# Patient Record
Sex: MALE | Race: WHITE | NOT HISPANIC OR LATINO | URBAN - METROPOLITAN AREA
[De-identification: names, ages, dates, MRNs, and addresses within clinical notes are randomized per-mention and may not be internally consistent; named-entity substitution may affect disease eponyms.]

---
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
## 2025-05-21 ENCOUNTER — HOSPITAL ENCOUNTER (INPATIENT)
Facility: CLINIC | Age: 20
DRG: 897 | End: 2025-05-21
Attending: STUDENT IN AN ORGANIZED HEALTH CARE EDUCATION/TRAINING PROGRAM | Admitting: PSYCHIATRY & NEUROLOGY
Payer: COMMERCIAL

## 2025-05-21 ENCOUNTER — TELEPHONE (OUTPATIENT)
Dept: BEHAVIORAL HEALTH | Facility: CLINIC | Age: 20
End: 2025-05-21
Payer: COMMERCIAL

## 2025-05-21 DIAGNOSIS — R56.9 ALCOHOL WITHDRAWAL SEIZURE WITH COMPLICATION (H): ICD-10-CM

## 2025-05-21 DIAGNOSIS — F19.20 CHEMICAL DEPENDENCY (H): Primary | ICD-10-CM

## 2025-05-21 DIAGNOSIS — F10.939 ALCOHOL WITHDRAWAL SEIZURE WITH COMPLICATION (H): ICD-10-CM

## 2025-05-21 DIAGNOSIS — F41.1 ANXIETY STATE: ICD-10-CM

## 2025-05-21 LAB
ALBUMIN SERPL BCG-MCNC: 5.2 G/DL (ref 3.5–5.2)
ALP SERPL-CCNC: 135 U/L (ref 40–150)
ALT SERPL W P-5'-P-CCNC: 101 U/L (ref 0–70)
AMPHETAMINES UR QL SCN: ABNORMAL
ANION GAP SERPL CALCULATED.3IONS-SCNC: 17 MMOL/L (ref 7–15)
AST SERPL W P-5'-P-CCNC: 132 U/L (ref 0–45)
BARBITURATES UR QL SCN: ABNORMAL
BASOPHILS # BLD AUTO: 0.1 10E3/UL (ref 0–0.2)
BASOPHILS NFR BLD AUTO: 1 %
BENZODIAZ UR QL SCN: ABNORMAL
BILIRUB SERPL-MCNC: 1.8 MG/DL
BUN SERPL-MCNC: 9.5 MG/DL (ref 6–20)
BZE UR QL SCN: ABNORMAL
CALCIUM SERPL-MCNC: 10.3 MG/DL (ref 8.8–10.4)
CANNABINOIDS UR QL SCN: ABNORMAL
CHLORIDE SERPL-SCNC: 97 MMOL/L (ref 98–107)
CREAT SERPL-MCNC: 0.81 MG/DL (ref 0.67–1.17)
EGFRCR SERPLBLD CKD-EPI 2021: >90 ML/MIN/1.73M2
EOSINOPHIL # BLD AUTO: 0.1 10E3/UL (ref 0–0.7)
EOSINOPHIL NFR BLD AUTO: 2 %
ERYTHROCYTE [DISTWIDTH] IN BLOOD BY AUTOMATED COUNT: 12.4 % (ref 10–15)
ETHANOL SERPL-MCNC: <0.01 G/DL
FENTANYL UR QL: ABNORMAL
GGT SERPL-CCNC: 277 U/L (ref 8–61)
GLUCOSE SERPL-MCNC: 85 MG/DL (ref 70–99)
HCO3 SERPL-SCNC: 26 MMOL/L (ref 22–29)
HCT VFR BLD AUTO: 45.4 % (ref 40–53)
HGB BLD-MCNC: 16.1 G/DL (ref 13.3–17.7)
IMM GRANULOCYTES # BLD: 0 10E3/UL
IMM GRANULOCYTES NFR BLD: 0 %
LYMPHOCYTES # BLD AUTO: 1.3 10E3/UL (ref 0.8–5.3)
LYMPHOCYTES NFR BLD AUTO: 27 %
MAGNESIUM SERPL-MCNC: 1.7 MG/DL (ref 1.7–2.3)
MCH RBC QN AUTO: 34 PG (ref 26.5–33)
MCHC RBC AUTO-ENTMCNC: 35.5 G/DL (ref 31.5–36.5)
MCV RBC AUTO: 96 FL (ref 78–100)
MONOCYTES # BLD AUTO: 0.8 10E3/UL (ref 0–1.3)
MONOCYTES NFR BLD AUTO: 17 %
NEUTROPHILS # BLD AUTO: 2.4 10E3/UL (ref 1.6–8.3)
NEUTROPHILS NFR BLD AUTO: 52 %
NRBC # BLD AUTO: 0 10E3/UL
NRBC BLD AUTO-RTO: 0 /100
OPIATES UR QL SCN: ABNORMAL
PCP QUAL URINE (ROCHE): ABNORMAL
PLATELET # BLD AUTO: 255 10E3/UL (ref 150–450)
POTASSIUM SERPL-SCNC: 3.9 MMOL/L (ref 3.4–5.3)
PROT SERPL-MCNC: 8.3 G/DL (ref 6.4–8.3)
RBC # BLD AUTO: 4.73 10E6/UL (ref 4.4–5.9)
SODIUM SERPL-SCNC: 140 MMOL/L (ref 135–145)
TSH SERPL DL<=0.005 MIU/L-ACNC: 2.43 UIU/ML (ref 0.3–4.2)
WBC # BLD AUTO: 4.7 10E3/UL (ref 4–11)

## 2025-05-21 PROCEDURE — 82077 ASSAY SPEC XCP UR&BREATH IA: CPT

## 2025-05-21 PROCEDURE — 84443 ASSAY THYROID STIM HORMONE: CPT | Performed by: NURSE PRACTITIONER

## 2025-05-21 PROCEDURE — 250N000013 HC RX MED GY IP 250 OP 250 PS 637: Performed by: STUDENT IN AN ORGANIZED HEALTH CARE EDUCATION/TRAINING PROGRAM

## 2025-05-21 PROCEDURE — 85014 HEMATOCRIT: CPT

## 2025-05-21 PROCEDURE — 83036 HEMOGLOBIN GLYCOSYLATED A1C: CPT | Performed by: PSYCHIATRY & NEUROLOGY

## 2025-05-21 PROCEDURE — 99285 EMERGENCY DEPT VISIT HI MDM: CPT | Performed by: STUDENT IN AN ORGANIZED HEALTH CARE EDUCATION/TRAINING PROGRAM

## 2025-05-21 PROCEDURE — HZ2ZZZZ DETOXIFICATION SERVICES FOR SUBSTANCE ABUSE TREATMENT: ICD-10-PCS | Performed by: PSYCHIATRY & NEUROLOGY

## 2025-05-21 PROCEDURE — 83735 ASSAY OF MAGNESIUM: CPT

## 2025-05-21 PROCEDURE — 250N000013 HC RX MED GY IP 250 OP 250 PS 637

## 2025-05-21 PROCEDURE — 82977 ASSAY OF GGT: CPT | Performed by: NURSE PRACTITIONER

## 2025-05-21 PROCEDURE — 80053 COMPREHEN METABOLIC PANEL: CPT

## 2025-05-21 PROCEDURE — 250N000013 HC RX MED GY IP 250 OP 250 PS 637: Performed by: REGISTERED NURSE

## 2025-05-21 PROCEDURE — 99207 PR NO CHARGE LOS: CPT | Performed by: NURSE PRACTITIONER

## 2025-05-21 PROCEDURE — 99285 EMERGENCY DEPT VISIT HI MDM: CPT | Mod: 25 | Performed by: STUDENT IN AN ORGANIZED HEALTH CARE EDUCATION/TRAINING PROGRAM

## 2025-05-21 PROCEDURE — 128N000004 HC R&B CD ADULT

## 2025-05-21 PROCEDURE — 80307 DRUG TEST PRSMV CHEM ANLYZR: CPT

## 2025-05-21 PROCEDURE — 36415 COLL VENOUS BLD VENIPUNCTURE: CPT

## 2025-05-21 RX ORDER — MAGNESIUM HYDROXIDE/ALUMINUM HYDROXICE/SIMETHICONE 120; 1200; 1200 MG/30ML; MG/30ML; MG/30ML
30 SUSPENSION ORAL EVERY 4 HOURS PRN
Status: DISCONTINUED | OUTPATIENT
Start: 2025-05-21 | End: 2025-05-23 | Stop reason: HOSPADM

## 2025-05-21 RX ORDER — ONDANSETRON 4 MG/1
4 TABLET, ORALLY DISINTEGRATING ORAL EVERY 6 HOURS PRN
Status: DISCONTINUED | OUTPATIENT
Start: 2025-05-21 | End: 2025-05-23 | Stop reason: HOSPADM

## 2025-05-21 RX ORDER — GABAPENTIN 100 MG/1
100 CAPSULE ORAL EVERY 8 HOURS
Status: DISCONTINUED | OUTPATIENT
Start: 2025-05-29 | End: 2025-05-22

## 2025-05-21 RX ORDER — ACETAMINOPHEN 325 MG/1
650 TABLET ORAL EVERY 4 HOURS PRN
Status: DISCONTINUED | OUTPATIENT
Start: 2025-05-21 | End: 2025-05-23 | Stop reason: HOSPADM

## 2025-05-21 RX ORDER — GABAPENTIN 300 MG/1
300 CAPSULE ORAL EVERY 8 HOURS
Status: DISCONTINUED | OUTPATIENT
Start: 2025-05-27 | End: 2025-05-22

## 2025-05-21 RX ORDER — CLONIDINE HYDROCHLORIDE 0.1 MG/1
0.1 TABLET ORAL EVERY 8 HOURS
Status: DISCONTINUED | OUTPATIENT
Start: 2025-05-21 | End: 2025-05-22

## 2025-05-21 RX ORDER — DIAZEPAM 5 MG/1
10 TABLET ORAL EVERY 30 MIN PRN
Status: DISCONTINUED | OUTPATIENT
Start: 2025-05-21 | End: 2025-05-21

## 2025-05-21 RX ORDER — DIAZEPAM 10 MG/2ML
5-10 INJECTION, SOLUTION INTRAMUSCULAR; INTRAVENOUS EVERY 30 MIN PRN
Status: DISCONTINUED | OUTPATIENT
Start: 2025-05-21 | End: 2025-05-21

## 2025-05-21 RX ORDER — FOLIC ACID 1 MG/1
1 TABLET ORAL DAILY
Status: DISCONTINUED | OUTPATIENT
Start: 2025-05-21 | End: 2025-05-23 | Stop reason: HOSPADM

## 2025-05-21 RX ORDER — DIAZEPAM 5 MG/1
5-20 TABLET ORAL EVERY 30 MIN PRN
Status: DISCONTINUED | OUTPATIENT
Start: 2025-05-21 | End: 2025-05-21

## 2025-05-21 RX ORDER — FLUMAZENIL 0.1 MG/ML
0.2 INJECTION, SOLUTION INTRAVENOUS
Status: DISCONTINUED | OUTPATIENT
Start: 2025-05-21 | End: 2025-05-22

## 2025-05-21 RX ORDER — HALOPERIDOL 5 MG/ML
2.5-5 INJECTION INTRAMUSCULAR EVERY 6 HOURS PRN
Status: DISCONTINUED | OUTPATIENT
Start: 2025-05-21 | End: 2025-05-22

## 2025-05-21 RX ORDER — LOPERAMIDE HYDROCHLORIDE 2 MG/1
2 CAPSULE ORAL 4 TIMES DAILY PRN
Status: DISCONTINUED | OUTPATIENT
Start: 2025-05-21 | End: 2025-05-23 | Stop reason: HOSPADM

## 2025-05-21 RX ORDER — GABAPENTIN 300 MG/1
600 CAPSULE ORAL EVERY 8 HOURS
Status: DISCONTINUED | OUTPATIENT
Start: 2025-05-25 | End: 2025-05-22

## 2025-05-21 RX ORDER — TRAZODONE HYDROCHLORIDE 50 MG/1
50 TABLET ORAL
Status: DISCONTINUED | OUTPATIENT
Start: 2025-05-21 | End: 2025-05-23 | Stop reason: HOSPADM

## 2025-05-21 RX ORDER — DIAZEPAM 5 MG/1
5-20 TABLET ORAL EVERY 30 MIN PRN
Status: DISCONTINUED | OUTPATIENT
Start: 2025-05-21 | End: 2025-05-22

## 2025-05-21 RX ORDER — GABAPENTIN 300 MG/1
900 CAPSULE ORAL EVERY 8 HOURS
Status: DISCONTINUED | OUTPATIENT
Start: 2025-05-22 | End: 2025-05-22

## 2025-05-21 RX ORDER — MULTIPLE VITAMINS W/ MINERALS TAB 9MG-400MCG
1 TAB ORAL DAILY
Status: DISCONTINUED | OUTPATIENT
Start: 2025-05-21 | End: 2025-05-23 | Stop reason: HOSPADM

## 2025-05-21 RX ORDER — AMOXICILLIN 250 MG
1 CAPSULE ORAL 2 TIMES DAILY PRN
Status: DISCONTINUED | OUTPATIENT
Start: 2025-05-21 | End: 2025-05-23 | Stop reason: HOSPADM

## 2025-05-21 RX ORDER — GABAPENTIN 600 MG/1
1200 TABLET ORAL ONCE
Status: COMPLETED | OUTPATIENT
Start: 2025-05-21 | End: 2025-05-21

## 2025-05-21 RX ORDER — OLANZAPINE 5 MG/1
5-10 TABLET, ORALLY DISINTEGRATING ORAL EVERY 6 HOURS PRN
Status: DISCONTINUED | OUTPATIENT
Start: 2025-05-21 | End: 2025-05-22

## 2025-05-21 RX ORDER — HYDROXYZINE HYDROCHLORIDE 25 MG/1
25 TABLET, FILM COATED ORAL EVERY 4 HOURS PRN
Status: DISCONTINUED | OUTPATIENT
Start: 2025-05-21 | End: 2025-05-23 | Stop reason: HOSPADM

## 2025-05-21 RX ORDER — ATENOLOL 50 MG/1
50 TABLET ORAL DAILY PRN
Status: DISCONTINUED | OUTPATIENT
Start: 2025-05-21 | End: 2025-05-21

## 2025-05-21 RX ADMIN — CLONIDINE HYDROCHLORIDE 0.1 MG: 0.1 TABLET ORAL at 18:52

## 2025-05-21 RX ADMIN — GABAPENTIN 1200 MG: 600 TABLET, FILM COATED ORAL at 18:50

## 2025-05-21 RX ADMIN — Medication 1 TABLET: at 18:50

## 2025-05-21 RX ADMIN — FOLIC ACID 1 MG: 1 TABLET ORAL at 18:52

## 2025-05-21 RX ADMIN — THIAMINE HCL TAB 100 MG 100 MG: 100 TAB at 18:52

## 2025-05-21 RX ADMIN — DIAZEPAM 10 MG: 5 TABLET ORAL at 20:35

## 2025-05-21 RX ADMIN — DIAZEPAM 5 MG: 5 TABLET ORAL at 17:58

## 2025-05-21 ASSESSMENT — COLUMBIA-SUICIDE SEVERITY RATING SCALE - C-SSRS
6. HAVE YOU EVER DONE ANYTHING, STARTED TO DO ANYTHING, OR PREPARED TO DO ANYTHING TO END YOUR LIFE?: YES
3. HAVE YOU BEEN THINKING ABOUT HOW YOU MIGHT KILL YOURSELF?: YES
4. HAVE YOU HAD THESE THOUGHTS AND HAD SOME INTENTION OF ACTING ON THEM?: YES
1. IN THE PAST MONTH, HAVE YOU WISHED YOU WERE DEAD OR WISHED YOU COULD GO TO SLEEP AND NOT WAKE UP?: YES
5. HAVE YOU STARTED TO WORK OUT OR WORKED OUT THE DETAILS OF HOW TO KILL YOURSELF? DO YOU INTEND TO CARRY OUT THIS PLAN?: YES
BASED ON RESPONSES TO C-SSRS QS 1-6, WHAT IS THE PATIENT'S OVERALL RISK RATING FOR SUICIDE: HIGH RISK
2. HAVE YOU ACTUALLY HAD ANY THOUGHTS OF KILLING YOURSELF IN THE PAST MONTH?: YES
3. HAVE YOU BEEN THINKING ABOUT HOW YOU MIGHT KILL YOURSELF?: YES
4. HAVE YOU HAD THESE THOUGHTS AND HAD SOME INTENTION OF ACTING ON THEM?: YES
5. HAVE YOU STARTED TO WORK OUT OR WORKED OUT THE DETAILS OF HOW TO KILL YOURSELF? DO YOU INTEND TO CARRY OUT THIS PLAN?: YES
2. HAVE YOU ACTUALLY HAD ANY THOUGHTS OF KILLING YOURSELF IN THE PAST MONTH?: YES
1. IN THE PAST MONTH, HAVE YOU WISHED YOU WERE DEAD OR WISHED YOU COULD GO TO SLEEP AND NOT WAKE UP?: YES
6. HAVE YOU EVER DONE ANYTHING, STARTED TO DO ANYTHING, OR PREPARED TO DO ANYTHING TO END YOUR LIFE?: YES

## 2025-05-21 ASSESSMENT — ACTIVITIES OF DAILY LIVING (ADL)
ADLS_ACUITY_SCORE: 41
ADLS_ACUITY_SCORE: 15
HYGIENE/GROOMING: INDEPENDENT
LAUNDRY: WITH SUPERVISION
ORAL_HYGIENE: INDEPENDENT
ADLS_ACUITY_SCORE: 15
ADLS_ACUITY_SCORE: 41
ADLS_ACUITY_SCORE: 41
DRESS: INDEPENDENT
ADLS_ACUITY_SCORE: 15

## 2025-05-21 NOTE — TELEPHONE ENCOUNTER
S: [facility] , Provider Cousins calling at 6:34 PM with clinical on 20 year old/male presenting for alcohol detox.     B: Pt presents for ETOH detox.   Currently reports drinking 175 ml every couple of days with multiple beers for months.    Patient reports last use was yesterday at 2 pm.  Pt BAC: 0  Pt  denies hx of DT  Pt  denies hx of seizures. Last seizure: N/A  Pt endorsing the following symptoms of withdrawal: Shaky, Tremulous, Anxious, and Perspiration   MSSA Score: 2/3    Pt denies acute mental health or medical concerns.   Pt denies other drug use: None Amount/frequency: N/A    Does Pt have a detox care plan in Epic? None  Does pt present with specific needs, assistive devices, or exclusionary criteria? None  Is the patient ambulating, eating and drinking in the ED? Yes    A: Pt meets criteria to be presented for IP detox admission. Patient is voluntary    COVID Symptoms: No  If yes, COVID test required   Utox: In process  Magnesium: WNL  CMP: Abnormalities:   AST is 132 ALT is 101  CBC: WNL  HCG: N/A     R: Patient cleared and ready for behavioral bed placement: Yes    Pt is meeting criteria for presentation to 3A/CD    Does Patient need a Transfer Center request created? No, Pt is located within [facility] ED, [facility] ED, or [place] ED    6:40 PM Paged unit 3A On Call Provider    Provider accepted Pt via Teams Chat message for 3A/CD/Nicolette Brian  7:08 PM  MRN [id number] may be accepted to 3A/detox for Dr Damon after UDS results are back.      Updated worklist and added to the admit board. Did Indicia    7:13 PM Updated unit 3A     7:13 PM Updated [facility] ED

## 2025-05-21 NOTE — ED PROVIDER NOTES
"ED Provider Note  [facility]      History     Chief Complaint   Patient presents with    Drug / Alcohol Assessment    Suicidal     HPI  César Solomon is a 20 year old male who presents to the ED for alcohol withdrawal seeking alcohol detox. He relates that his last drink was around 2-3 pm. States that he has been drinking .75 L of whiskey daily with 1-2 beers in addition. He states that he has been drinking this much for the past few months since to avoid withdrawing from alcohol. He denies history of alcohol detox treatment. Denies past symptoms of seizures or Dts.   Patient endorses symptoms current of tremors, sweaty, and nausea.   Patient reports to me that he does not currently have any suicidal or homicidal ideation, no hallucinations, has felt passive SI in the past, but not currently feeling suicidal.  Feels safe in the hospital. Relating that his SI has improved over the past weeks as his stress from college and withdrawing have improved. Denies HI.     Past Medical History  No past medical history on file.  No past surgical history on file.  No current outpatient medications on file.    No Known Allergies  Family History  No family history on file.  Social History       A medically appropriate review of systems was performed with pertinent positives and negatives noted in the HPI, and all other systems negative.    Physical Exam   BP: (!) 140/85  Pulse: 95  Temp: 98.5  F (36.9  C)  Resp: 16  Height: 188 cm (6' 2\")  Weight: 74 kg (163 lb 1.6 oz)  SpO2: 97 %  Physical Exam  Constitutional:       General: He is not in acute distress.     Appearance: Normal appearance. He is not toxic-appearing.   HENT:      Head: Normocephalic and atraumatic.   Eyes:      General: No scleral icterus.     Conjunctiva/sclera: Conjunctivae normal.   Cardiovascular:      Rate and Rhythm: Normal rate and regular rhythm.      Pulses: Normal pulses.      Heart sounds: Normal heart sounds.   Pulmonary: "      Effort: Pulmonary effort is normal. No respiratory distress.      Breath sounds: Normal breath sounds.   Abdominal:      Palpations: Abdomen is soft.      Tenderness: There is no abdominal tenderness.   Musculoskeletal:         General: No deformity.      Cervical back: Neck supple.   Skin:     General: Skin is warm.   Neurological:      Mental Status: He is alert and oriented to person, place, and time.      Comments: Slight hand shaking and tongue fasciculation, otherwise neurologic intact, no focal deficits           ED Course, Procedures, & Data      Procedures                Results for orders placed or performed during the hospital encounter of 05/21/25   Comprehensive metabolic panel     Status: Abnormal   Result Value Ref Range    Sodium 140 135 - 145 mmol/L    Potassium 3.9 3.4 - 5.3 mmol/L    Carbon Dioxide (CO2) 26 22 - 29 mmol/L    Anion Gap 17 (H) 7 - 15 mmol/L    Urea Nitrogen 9.5 6.0 - 20.0 mg/dL    Creatinine 0.81 0.67 - 1.17 mg/dL    GFR Estimate >90 >60 mL/min/1.73m2    Calcium 10.3 8.8 - 10.4 mg/dL    Chloride 97 (L) 98 - 107 mmol/L    Glucose 85 70 - 99 mg/dL    Alkaline Phosphatase 135 40 - 150 U/L     (H) 0 - 45 U/L     (H) 0 - 70 U/L    Protein Total 8.3 6.4 - 8.3 g/dL    Albumin 5.2 3.5 - 5.2 g/dL    Bilirubin Total 1.8 (H) <=1.2 mg/dL   Magnesium     Status: Normal   Result Value Ref Range    Magnesium 1.7 1.7 - 2.3 mg/dL   Ethanol Level Blood     Status: Normal   Result Value Ref Range    Ethanol Level Blood <0.01 <=0.01 g/dL   CBC with platelets and differential     Status: Abnormal   Result Value Ref Range    WBC Count 4.7 4.0 - 11.0 10e3/uL    RBC Count 4.73 4.40 - 5.90 10e6/uL    Hemoglobin 16.1 13.3 - 17.7 g/dL    Hematocrit 45.4 40.0 - 53.0 %    MCV 96 78 - 100 fL    MCH 34.0 (H) 26.5 - 33.0 pg    MCHC 35.5 31.5 - 36.5 g/dL    RDW 12.4 10.0 - 15.0 %    Platelet Count 255 150 - 450 10e3/uL    % Neutrophils 52 %    % Lymphocytes 27 %    % Monocytes 17 %    %  Eosinophils 2 %    % Basophils 1 %    % Immature Granulocytes 0 %    NRBCs per 100 WBC 0 <1 /100    Absolute Neutrophils 2.4 1.6 - 8.3 10e3/uL    Absolute Lymphocytes 1.3 0.8 - 5.3 10e3/uL    Absolute Monocytes 0.8 0.0 - 1.3 10e3/uL    Absolute Eosinophils 0.1 0.0 - 0.7 10e3/uL    Absolute Basophils 0.1 0.0 - 0.2 10e3/uL    Absolute Immature Granulocytes 0.0 <=0.4 10e3/uL    Absolute NRBCs 0.0 10e3/uL   CBC with platelets differential     Status: Abnormal    Narrative    The following orders were created for panel order CBC with platelets differential.  Procedure                               Abnormality         Status                     ---------                               -----------         ------                     CBC with platelets and ...[[id number]]  Abnormal            Final result                 Please view results for these tests on the individual orders.   Urine Drug Screen     Status: None ()    Narrative    The following orders were created for panel order Urine Drug Screen.  Procedure                               Abnormality         Status                     ---------                               -----------         ------                     Urine Drug Screen Panel[[id number]]                                                      Please view results for these tests on the individual orders.     Medications   OLANZapine zydis (zyPREXA) ODT tab 5-10 mg (has no administration in time range)     Or   haloperidol lactate (HALDOL) injection 2.5-5 mg (has no administration in time range)   flumazenil (ROMAZICON) injection 0.2 mg (has no administration in time range)   melatonin tablet 5 mg (has no administration in time range)   cloNIDine (CATAPRES) tablet 0.1 mg (has no administration in time range)   diazepam (VALIUM) tablet 10 mg (has no administration in time range)     Or   diazepam (VALIUM) injection 5-10 mg (has no administration in time range)   gabapentin (NEURONTIN) tablet 1,200 mg (has no  administration in time range)   gabapentin (NEURONTIN) capsule 900 mg (has no administration in time range)   gabapentin (NEURONTIN) capsule 600 mg (has no administration in time range)   gabapentin (NEURONTIN) capsule 300 mg (has no administration in time range)   gabapentin (NEURONTIN) capsule 100 mg (has no administration in time range)   multivitamin w/minerals (THERA-VIT-M) tablet 1 tablet (has no administration in time range)   thiamine (B-1) tablet 100 mg (has no administration in time range)   folic acid (FOLVITE) tablet 1 mg (has no administration in time range)     Labs Ordered and Resulted from Time of ED Arrival to Time of ED Departure   COMPREHENSIVE METABOLIC PANEL - Abnormal       Result Value    Sodium 140      Potassium 3.9      Carbon Dioxide (CO2) 26      Anion Gap 17 (*)     Urea Nitrogen 9.5      Creatinine 0.81      GFR Estimate >90      Calcium 10.3      Chloride 97 (*)     Glucose 85      Alkaline Phosphatase 135       (*)      (*)     Protein Total 8.3      Albumin 5.2      Bilirubin Total 1.8 (*)    CBC WITH PLATELETS AND DIFFERENTIAL - Abnormal    WBC Count 4.7      RBC Count 4.73      Hemoglobin 16.1      Hematocrit 45.4      MCV 96      MCH 34.0 (*)     MCHC 35.5      RDW 12.4      Platelet Count 255      % Neutrophils 52      % Lymphocytes 27      % Monocytes 17      % Eosinophils 2      % Basophils 1      % Immature Granulocytes 0      NRBCs per 100 WBC 0      Absolute Neutrophils 2.4      Absolute Lymphocytes 1.3      Absolute Monocytes 0.8      Absolute Eosinophils 0.1      Absolute Basophils 0.1      Absolute Immature Granulocytes 0.0      Absolute NRBCs 0.0     MAGNESIUM - Normal    Magnesium 1.7     ETHANOL LEVEL BLOOD - Normal    Ethanol Level Blood <0.01     URINE DRUG SCREEN PANEL     No orders to display          Critical care was not performed.     Medical Decision Making  The patient's presentation was of high complexity (an acute health issue posing potential  threat to life or bodily function).    The patient's evaluation involved:  review of external note(s) from 3+ sources (see separate area of note for details)  review of 3+ test result(s) ordered prior to this encounter (see separate area of note for details)  ordering and/or review of 3+ test(s) in this encounter (see separate area of note for details)    The patient's management necessitated high risk (a decision regarding hospitalization).    Assessment & Plan    Alcohol Withdrawal   Will presenting to the ED with symptoms of alcohol withdrawal. He goal was to seek alcohol withdrawal treatment. Patient lacks a history of severe withdrawal symptoms. He does endorse SI without plan and patient feels safe in the hospital. Alcohol detox bed was reserved for patient and routine admission labs were drawn. Plan is to admit César upstairs for alcohol detox.     --    ED Attending Physician Attestation    FRANCK Patterson MD, cared for this patient with the Resident. I have performed a history and physical examination of the patient and discussed management with the resident. I reviewed the resident's documentation above and agree with the documented findings and plan of care.    Summary of HPI, PE, ED Course   Patient is a 20 year old male evaluated in the emergency department for alcohol withdrawal. Exam and ED course notable for reassuring physical exam and workup. After the completion of care in the emergency department, the patient was admitted to inpatient.  Patient was admitted to detox      Hosea Patterson MD  Emergency Medicine      I have reviewed the nursing notes. I have reviewed the findings, diagnosis, plan and need for follow up with the patient.    New Prescriptions    No medications on file       Final diagnoses:   Alcohol withdrawal seizure with complication (H)   Alphonse Maria, PGY1    Hosea Patterson MD on 5/21/2025 at 6:37 PM   [facility] EMERGENCY DEPARTMENT  5/21/2025     Leonardo  MD Hosea  05/21/25 7671       Hosea Patterson MD  05/21/25 1527

## 2025-05-22 VITALS
HEIGHT: 74 IN | RESPIRATION RATE: 16 BRPM | HEART RATE: 60 BPM | TEMPERATURE: 97.1 F | OXYGEN SATURATION: 99 % | WEIGHT: 163 LBS | SYSTOLIC BLOOD PRESSURE: 119 MMHG | BODY MASS INDEX: 20.92 KG/M2 | DIASTOLIC BLOOD PRESSURE: 76 MMHG

## 2025-05-22 PROBLEM — F10.239 ALCOHOL DEPENDENCE WITH WITHDRAWAL WITH COMPLICATION (H): Status: ACTIVE | Noted: 2025-05-21

## 2025-05-22 LAB
ALBUMIN SERPL BCG-MCNC: 4.8 G/DL (ref 3.5–5.2)
ALP SERPL-CCNC: 120 U/L (ref 40–150)
ALT SERPL W P-5'-P-CCNC: 84 U/L (ref 0–70)
ANION GAP SERPL CALCULATED.3IONS-SCNC: 12 MMOL/L (ref 7–15)
AST SERPL W P-5'-P-CCNC: 99 U/L (ref 0–45)
BILIRUB SERPL-MCNC: 1.3 MG/DL
BUN SERPL-MCNC: 8.6 MG/DL (ref 6–20)
CALCIUM SERPL-MCNC: 10.3 MG/DL (ref 8.8–10.4)
CHLORIDE SERPL-SCNC: 99 MMOL/L (ref 98–107)
CREAT SERPL-MCNC: 0.84 MG/DL (ref 0.67–1.17)
EGFRCR SERPLBLD CKD-EPI 2021: >90 ML/MIN/1.73M2
EST. AVERAGE GLUCOSE BLD GHB EST-MCNC: 97 MG/DL
GLUCOSE SERPL-MCNC: 69 MG/DL (ref 70–99)
HBA1C MFR BLD: 5 %
HCO3 SERPL-SCNC: 28 MMOL/L (ref 22–29)
HOLD SPECIMEN: NORMAL
POTASSIUM SERPL-SCNC: 4.2 MMOL/L (ref 3.4–5.3)
PROT SERPL-MCNC: 7.7 G/DL (ref 6.4–8.3)
SODIUM SERPL-SCNC: 139 MMOL/L (ref 135–145)

## 2025-05-22 PROCEDURE — 99222 1ST HOSP IP/OBS MODERATE 55: CPT | Performed by: PHYSICIAN ASSISTANT

## 2025-05-22 PROCEDURE — 250N000013 HC RX MED GY IP 250 OP 250 PS 637: Performed by: PSYCHIATRY & NEUROLOGY

## 2025-05-22 PROCEDURE — 250N000013 HC RX MED GY IP 250 OP 250 PS 637: Performed by: STUDENT IN AN ORGANIZED HEALTH CARE EDUCATION/TRAINING PROGRAM

## 2025-05-22 PROCEDURE — 99223 1ST HOSP IP/OBS HIGH 75: CPT | Mod: GC | Performed by: PSYCHIATRY & NEUROLOGY

## 2025-05-22 PROCEDURE — 128N000004 HC R&B CD ADULT

## 2025-05-22 PROCEDURE — 36415 COLL VENOUS BLD VENIPUNCTURE: CPT | Performed by: PSYCHIATRY & NEUROLOGY

## 2025-05-22 PROCEDURE — 80053 COMPREHEN METABOLIC PANEL: CPT | Performed by: PSYCHIATRY & NEUROLOGY

## 2025-05-22 PROCEDURE — 250N000013 HC RX MED GY IP 250 OP 250 PS 637: Performed by: NURSE PRACTITIONER

## 2025-05-22 RX ORDER — LORAZEPAM 1 MG/1
1-4 TABLET ORAL EVERY 30 MIN PRN
Status: DISCONTINUED | OUTPATIENT
Start: 2025-05-22 | End: 2025-05-23

## 2025-05-22 RX ORDER — CLONIDINE HYDROCHLORIDE 0.1 MG/1
0.1 TABLET ORAL EVERY 8 HOURS
Status: DISCONTINUED | OUTPATIENT
Start: 2025-05-22 | End: 2025-05-22

## 2025-05-22 RX ORDER — GABAPENTIN 300 MG/1
900 CAPSULE ORAL EVERY 8 HOURS
Status: DISCONTINUED | OUTPATIENT
Start: 2025-05-22 | End: 2025-05-22

## 2025-05-22 RX ADMIN — HYDROXYZINE HYDROCHLORIDE 25 MG: 25 TABLET, FILM COATED ORAL at 21:16

## 2025-05-22 RX ADMIN — HYDROXYZINE HYDROCHLORIDE 25 MG: 25 TABLET, FILM COATED ORAL at 13:45

## 2025-05-22 RX ADMIN — THIAMINE HCL TAB 100 MG 100 MG: 100 TAB at 09:02

## 2025-05-22 RX ADMIN — HYDROXYZINE HYDROCHLORIDE 25 MG: 25 TABLET, FILM COATED ORAL at 16:48

## 2025-05-22 RX ADMIN — Medication 1 TABLET: at 09:01

## 2025-05-22 RX ADMIN — FOLIC ACID 1 MG: 1 TABLET ORAL at 09:02

## 2025-05-22 RX ADMIN — HYDROXYZINE HYDROCHLORIDE 25 MG: 25 TABLET, FILM COATED ORAL at 09:01

## 2025-05-22 RX ADMIN — TRAZODONE HYDROCHLORIDE 50 MG: 50 TABLET ORAL at 21:16

## 2025-05-22 RX ADMIN — LORAZEPAM 2 MG: 1 TABLET ORAL at 09:06

## 2025-05-22 ASSESSMENT — ACTIVITIES OF DAILY LIVING (ADL)
ORAL_HYGIENE: INDEPENDENT
ADLS_ACUITY_SCORE: 15
HYGIENE/GROOMING: INDEPENDENT
ADLS_ACUITY_SCORE: 15
ADLS_ACUITY_SCORE: 15
DRESS: SCRUBS (BEHAVIORAL HEALTH)
ADLS_ACUITY_SCORE: 15
LAUNDRY: UNABLE TO COMPLETE
ADLS_ACUITY_SCORE: 15
ADLS_ACUITY_SCORE: 15
ORAL_HYGIENE: INDEPENDENT
DRESS: INDEPENDENT
ADLS_ACUITY_SCORE: 15
HYGIENE/GROOMING: INDEPENDENT
ADLS_ACUITY_SCORE: 15

## 2025-05-22 NOTE — CONSULTS
"[facility]  Consult Note - Hospitalist Service  Date of Admission:  5/21/2025  Consult Requested by: Psychiatry   Reason for Consult: \"detox\"    Assessment & Plan   César Solomon is a 20 year old male with no significant PMH who was admitted on 5/21/2025 to inpatient detox for acute alcohol withdrawal.     Medicine was consulted for medical co-management.     Acute alcohol withdrawal   Presented with an undetectable blood alcohol level. Has bene drinking 750mL of whiskey daily with 1-2 beers in addition, has been drinking this much for the past few months to avoid withdrawing. Last drink was 2-3 hours prior to presenting to ED. Denies history of withdrawal seizures or Dts.    -Management per Psychiatry  -MSSA/CIWA with valium   -Thiamine/folate/MVI    Elevated LFTs   Presented with , , T Bilirubin of 1.8. Alk phos wnl. No prior CMP for comparison. No prior abdominal imaging. Repeat showed improvment. No abdominal pain, nausea, vomiting   -Recheck with PCP in ~1 week from discharge     Cannabis use: note don UDS     Medicine will sign off at this time. Please reach out to medicine team if further questions arise.      The patient's care was discussed with the Bedside Nurse, Patient, and Primary team via this note.    Clinically Significant Risk Factors Present on Admission          # Hypochloremia: Lowest Cl = 97 mmol/L in last 2 days, will monitor as appropriate                               Ernestina Mckenna PA-C  Hospitalist Service  Securely message with [employer] (more info)  Text page via [facility] Paging/Directory   ______________________________________________________________________    Chief Complaint   Detox     History is obtained from the patient    History of Present Illness   César Solomon is a 20 year old male with no significant PMH who was admitted on 5/21/2025 to inpatient detox for acute alcohol withdrawal.     Medicine was consulted for " "medical co-management.     Only withdrawal symptom that he is appreciating today tremor. He also is experiencing anxiety, but he thinks that might be cause he has never been to detox before and this is a different environment. No sweats, hallucinations, headaches, nausea, vomiting, loose stools. No other symptoms outside of withdrawal including chest pain, fever, chills, dyspnea, cough, urinary sxs, rashes or swelling in his legs. He comments that he has flunked out of school in the setting of alcohol use and he wants to get \"back on track\". No PMH and no meds per his report.       Past Medical History    No past medical history on file.    Past Surgical History   No past surgical history on file.    Medications   I have reviewed this patient's current medications       Review of Systems    The 10 point Review of Systems is negative other than noted in the HPI or here.     Social History   I have reviewed this patient's social history and updated it with pertinent information if needed.         Family History           Allergies   Allergies   Allergen Reactions    Pollen Extract Other (See Comments)        Physical Exam   Vital Signs: Temp: 97.1  F (36.2  C) Temp src: Temporal BP: 113/77 Pulse: 76   Resp: 16 SpO2: 98 % O2 Device: None (Room air)    Weight: 163 lbs 0 oz    General: sitting up on edge of bed, alert, cooperative, awake, in no acute distress  HEENT: normocephalic, atraumatic, anicteric sclera  Respiratory: breathing comfortably on room air, clear to auscultation bilaterally without wheezing, crackles, or rhonchi appreciated  Cardiac: regular rate and rhythm with normal S1/S2 without murmurs, clicks, rubs or gallops, 2+ radial pulse on LUE, no signs of peripheral edema bilaterally  GI: soft, non-distended, normoactive bowel sounds, non-tender per palpation  Neuro: grossly non-focal, alert and oriented, normal speech, somewhat tremulous   MSK: no bony deformities, moving all extremities " independently  Skin: no rashes or lesions on uncovered surfaces, no jaundice      Medical Decision Making       60 MINUTES SPENT BY ME on the date of service doing chart review, history, exam, documentation & further activities per the note.      Data   NOTE: Data reviewed over the past 24 hrs contributes toward MDM complexity

## 2025-05-22 NOTE — PLAN OF CARE
César Solomon is a 20-year-old male who was admitted from the emergency room to station 3A for alcohol detox. Per chart review and ED report patient has been drinking 0.75 L of whiskey and beer 1-2 daily for the past few months. The patient's last drink was around 2 or 3 pm yesterday, denied symptoms of seizure and DTs. Patient reported his stressor were having bad grades in school. Labs: Utox positive for cannabis, ALT-101, AST-132 and bilirubin total 1.8.   Patient is alert and oriented x 4, denied pain and discomfort, and communicates needs appropriately. Patient skin clean dry and intact, ambulate independently and denied recent falls. Patient first admission for detox, endorses anxiety 3/10 and depression 4/10, no aggressive or assaultive behavior. Patient was visible in the lounge area and observed socializing with peers and staff. Patient denies SI/SIB/HI and hallucination.    MSSA 8, received prn valium 10 mg once.

## 2025-05-22 NOTE — CONSULTS
Brief Psychiatry Note   Chart and labs reviewed. Patient meets criteria for 3A detox admission to detox from [facility] once his UDS results are back. Orders for admission placed.     Most Recent 2 LFT's:  Recent Labs   Lab Test 05/21/25  1746   *   *   ALKPHOS 135   BILITOTAL 1.8*       Nicolette Regan, KOLBY CNP

## 2025-05-22 NOTE — PLAN OF CARE
Problem: Adult Behavioral Health Plan of Care  Goal: Plan of Care Review  Recent Flowsheet Documentation  Taken 5/22/2025 1300 by Valentina Coburn, RN  Patient Agreement with Plan of Care: agrees  Taken 5/22/2025 1000 by Valentina Coburn, RN  Patient Agreement with Plan of Care: agrees   Goal Outcome Evaluation:    Plan of Care Reviewed With: patient       Pt quiet and socially withdrawn, spent the shift in the lounge sitting by himself, this is his first time in detox and is unsure what is expected. Unit tour and expectations given, pt completed admission packet and met with case management.   He was tremulous and anxious this morning, denied pain , MSSA 8/2. Pt is interested in IOP, near home, pt encouraged to complete [facility]IL and safety plan paperwork. VSS.         Hydroxyzine 25mg given x2 this shift.

## 2025-05-22 NOTE — DISCHARGE INSTRUCTIONS
Behavioral Discharge Planning and Instructions  THANK YOU FOR CHOOSING [facility]  3A  [phone]    Summary: You were admitted to Station [facility] on *** for detoxification from ***.  A medical exam was performed that included lab work. You have met with a [facility] Counselor and opted to ***.  Please take care and make your recovery a daily priority, César!  It was a pleasure working with you and the entire treatment team here wishes you the very best in your recovery!     Recommendation:  ***    Main Diagnoses:    {Substance Related Use Disorders:703866}    Major Treatments, Procedures and Findings:  You were treated for *** detoxification using ***. As an outpatient you will be prescribed ***, please take this medication as prescribed until it is gone. You have met with a [facility] counselor to develop a treatment plan for discharge. You had labs drawn and those results were reviewed with you. Please take a copy of your lab work with you to your next primary care provider appointment.    Symptoms to Report:  If you experience more anxiety, confusion, sleeplessness, deep sadness or thoughts of suicide, notify your treatment team or notify your primary care provider. IF ANY OF THE SYMPTOMS YOU ARE EXPERIENCING ARE A MEDICAL EMERGENCY CALL 911 IMMEDIATELY.     Lifestyle Adjustment: Adjust your lifestyle to get enough sleep, relaxation, exercise and good nutrition. Continue to develop healthy coping skills to decrease stress and promote a sober living environment. Do not use mood altering substances including alcohol, illegal drugs or addictive medications other than what is currently prescribed.     Disposition: ***    Facts about COVID19 at www.cdc.gov/COVID19 and www.[place].gov/covid19    Keeping hands clean is one of the most important steps we can take to avoid getting sick and spreading germs to others.  Please wash your hands frequently and lather with soap for at least 20 seconds!    Follow-up Appointment:    Appointment Date/Time: ***    Psychiatrist/Primary Care Giver: ***    Address: ***    Phone Number: ***      Recovery apps for your phone for educational purposes and to locate in person and zoom recovery meetings  Everything AA -  lavelle is a great resource  12 Step Toolkit - educational purpose learning about the 12 steps to recovery  [place] [employer] - meeting lavelle  AA  - meeting lavelle  Meeting guide - meeting lavelle  Quick NA meeting - meeting lavelle  Haile- has various apps    Patient Navigation Hub  [facility] Navigators work to be your point-of-contact for trustworthy and compassionate care from Inpatient services to [facility] Programmatic Care. We will provide resources and communication to help guide you into programmatic care. Ultimately, our goal is to be the one-stop-shop of communication, coordination, and support for your journey to programmatic care.  Phone: [phone]    Resources:  AA/NA meetings have returned to in-person or a hybrid combination of zoom/in-person therefore please check online to verify*  Need Support Now? If you or someone you know is struggling or in crisis, help is available. Call or text Gemmyo8 or chat [employer].Apruve  AA meetings search for them at: https://aa-intergroup.org (worldwide meeting listings)  AA meetings for [place] area can be found online at: https://[place].org (click local online meetings listings)  NA meetings for [place] area can be found online at: https://www.nam[place].org  (click find a meeting)  AA and NA Sponsors are excellent resources for support and you can find one at any support group meeting.   Alcoholics Anonymous (https://aa.org/): for information 24 hours/day  AA Intergroup service office in [place] (http://www.aastpaul.org/) [phone]  AA Intergroup service office in [facility]: 198.351.2697. (http://www.a[place].org/)  Narcotics Anonymous (www.nam[place].org) (011)  "643-2203  https://aafairviewriverside.org/meetings  SMART Recovery - self management for addiction recovery:  www.smartrecovery.org  Pathways ~ A Health Crisis Resource & Support Center:  837.715.1406.  https://prescribetoprevent.org/patient-education/videos/  http://www.harmreduction.org  Crisis Text Line  Text 275476  You will be connected with a trained live crisis counselor to provide support. Por espanol, texto  JERMAINE a 893235 o texto a 442-AYUDAME en WhatsApp  [place] Hope Line  1.800.SUICIDE [1498066]  [facility] 154-661-0223  Support Group:  AA/NA and Sponsor/support.  Fast Tracker  Linking people to mental health and substance use disorder resources  RotaryViewckCoolirisn.org   [place] Mental Health Warm Line  Peer to peer support  Monday thru Saturday, 12 pm to 10 pm  169.033.0095 or 2.963.916.6390  Text \"Support\" to 58148  National [place] on Mental Illness (ROSA MARIA)  074.308.3624 or 1.888.ROSA MARIA.HELPS  Alcoholics Anonymous (www.alcoholics-anonymous.org): Check your phone book for your local chapter.  Suicide Awareness Voices of Education (SAVE) (www.save.org): 697-962-OXGQ (1583)  National Suicide Prevention Line (www.mentalhealthmn.org): 618-246-AIPI (0768)  Mental Health Consumer/Survivor Network of [place] (www.mhcsn.net): 583.546.9382 or 709-718-9182  Mental Health Association of [place] (www.mentalhealth.org): 394.916.1410 or 699-201-4294   Substance Abuse and Mental Health Services (www.samhsa.gov)  [place] Opioid Prevention Coalition: www.opioidcoalition.org    [place] Recovery Connection (MRC)  [facility] connects people seeking recovery to resources that help foster and sustain long-term recovery.  Whether you are seeking resources for treatment, transportation, housing, job training, education, health care or other pathways to recovery, [facility] is a great place to start.  367.621.1065.  www.meinKauf.org    Great Pod casts for nutrition and wellness  Listen on Apple Podcasts  Dishing Up " Nutrition   [employer]   Nutrition       Understand the connection between what you eat and how you feel. Hosted by licensed nutritionists and dietitians from [employer] [facility] we share practical, real-life solutions for healthier living through nutrition.     General Medication Instructions:   See your medication sheet(s) for instructions.   Take all medications as prescribed.  Make no changes unless your primary care provider suggests them.   Go to all your primary care provider visits.  Be sure to have all your required lab tests. This way, your medicines can be refilled on time.  Do not use any forms of alcohol.    Please Note: If you have any questions at anytime after you discharged please call [facility] detox unit 3A at [phone].    Here are a list of additional numbers you can call if you are wanting to resume services through [facility]:  [facility] Assessment Intake: [phone]  [facility] Adult [facility] Intensive Outpatient  call: [phone]  [facility] Admissions [phone]    Recovery Clinic call: [phone]  [facility]: [phone]  Medical Records call: [phone]  Billing Department call: [phone]    Please remember to take all of your behavioral discharge planning and lab paperwork to any follow up appointments, it contains your lab results, diagnosis, medication list and discharge recommendations.      THANK YOU FOR CHOOSING [facility]    [phone]  [facility] Admissions [phone]    Recovery Clinic call: [phone]  [facility] Center: [phone]  Medical Records call: [phone]  Billing Department call: [phone]    Please remember to take all of your behavioral discharge planning and lab paperwork to any follow up appointments, it contains your lab results, diagnosis, medication list and discharge recommendations.      THANK YOU FOR CHOOSING [facility]

## 2025-05-22 NOTE — H&P
Psychiatry History and Physical    César Solomon MRN# [id number]   Age: 20 year old YOB: 2005     Date of Admission:  5/21/2025          Assessment:   This patient is a 20 year old male with history of alcohol use disorder who presented to ED seeking detox from alcohol. Medically cleared in ED, admitted to 3A as voluntary patient. Drinking 1.75 L of whiskey every 2 to 3 days. EtOH BROOKE <0.01, UDS positive for cannabinoids. MSSA with diazepam started for alcohol withdrawal, changed to lorazepam due to LFTs. Withdrawal precautions in place, denies history of seizures. Admission labs ordered and reviewed those resulted. IM consult placed.  No safety concerns including SI and HI. Pt not taking medications PTA, open to considering AUD MAT. Pt reports goals for hospitalization are to abstinence from alcohol and get set up with some form of Treatment Programming.      Inpatient psychiatric hospitalization is warranted at this time for safety, stabilization, and possible adjustment in medications.         Diagnoses:     Alcohol use disorder, severe, dependence with withdrawal with unspecified complication  Cannabis abuse vs use disorder  Unspecified anxiety R/O substance induced mood disorder   Elevated LFTs including hyperbilirubinemia  High anion gap metabolic acidosis     Clinically Significant Risk Factors Present on Admission          # Hypochloremia: Lowest Cl = 97 mmol/L in last 2 days, will monitor as appropriate                                       Plan:   Psychiatric treatment/inteventions:  Medications:   -MSSA with lorazepam (changed from diazepam due to LFTs), thiamine, folic acid, multivitamin   -PRN hydroxyzine 25 mg every 4 hours for anxiety   -PRN trazodone 50 mg at bedtime for sleep   -consider MAT for AUD prior to discharge.  Patient provided information packets regarding acamprosate and naltrexone and disulfiram.   -pt received 1 x gabapentin 1200 mg on 5/21/25 for withdrawal per PACO  in ED  -patient interested in IOP near [place].    Laboratory/Imaging: reviewed- EtOH BROOKE <0.01, UDS positive for cannabinoids; CMP with Cl 97(L), (H0, (H), T bili 1.8(H) otherwise WNL; (H); TSH WNL; Mg WNL; CBC with MCH 34.0(H) otherwise WNL; Hgba1c ordered to complete admission labs, repeat CMP ordered to trend     Patient will be treated in therapeutic milieu with appropriate individual and group therapies as described.    Medical treatment/interventions:  Medical concerns:   1) Alcohol withdrawal  -MSSA as above  -PRN Zofran and imodium for GI distress related to withdrawal   -withdrawal precautions    2) IM consult placed for management of other medical concerns, per consult note on 5/22/25:   César Solomon is a 20 year old male with no significant PMH who was admitted on 5/21/2025 to inpatient detox for acute alcohol withdrawal.      Medicine was consulted for medical co-management.      Acute alcohol withdrawal   Presented with an undetectable blood alcohol level. Has bene drinking 750mL of whiskey daily with 1-2 beers in addition, has been drinking this much for the past few months to avoid withdrawing. Last drink was 2-3 hours prior to presenting to ED. Denies history of withdrawal seizures or Dts.    -Management per Psychiatry  -MSSA/CIWA with valium   -Thiamine/folate/MVI     Elevated LFTs   Presented with , , T Bilirubin of 1.8. Alk phos wnl. No prior CMP for comparison. No prior abdominal imaging. Repeat showed improvment. No abdominal pain, nausea, vomiting   -Recheck with PCP in ~1 week from discharge      Cannabis use: note don UDS      Medicine will sign off at this time. Please reach out to medicine team if further questions arise.         The patient's care was discussed with the Bedside Nurse, Patient, and Primary team via this note.     Ernestina Mckenna PA-C  Hospitalist Service      Legal Status: Voluntary    Safety Assessment:   Behavioral Orders    Procedures    Code 1 - Restrict to Unit    Routine Programming     As clinically indicated    Status 15     Every 15 minutes.    Suicide precautions: Suicide Risk: LOW; Clinical rationale to override score: modification to the care environment     .     Suicide Risk:   LOW     Clinical rationale to override score::   modification to the care environment    Withdrawal precautions      Pt has not required locked seclusion or restraints in the past 24 hours to maintain safety, please refer to RN documentation for further details.    The risks, benefits, alternatives and side effects have been discussed and are understood by the patient.    Disposition: Pending clinical stabilization. Will likely discharge to Home with IOP when stable.    Gabriele Agee MD  Addiction Medicine Fellow    Attestation: This patient has been seen and evaluated by me, Yanelis Damon DO on the date of this note. I have discussed this patient with the fellow and I agree with the findings and plan in this note. I have reviewed today's vital signs, medications, labs and imaging. >75 min total time that was spent in counseling and coordination of care with staff, reviewing medical record, educating patient about treatment options, side effects and benefits and alternative treatments for medications, providing supportive therapy and redirection regarding above symptoms.    Yanelis Damon DO            Chief Complaint:     Alcohol withdrawal          History of Present Illness:     César is a 20 year old male with history of alcohol use disorder who presented to ED seeking detox from alcohol.     Chart review completed including ED notes from this encounter; PCP visit on 1/27/25 in [place] for dizziness and visual changes; several Allergy care visits in last year; no prior ed visits or hospitalizations due to alcohol.  Majority of patient's care in the last few years in [place] (where patient is going to school for  business).    Per ED physician note: César Solomon is a 20 year old male who presents to the ED for alcohol withdrawal seeking alcohol detox. He relates that his last drink was around 2-3 pm. States that he has been drinking .75 L of whiskey daily with 1-2 beers in addition. He states that he has been drinking this much for the past few months since to avoid withdrawing from alcohol. He denies history of alcohol detox treatment. Denies past symptoms of seizures or Dt's.   Patient endorses symptoms current of tremors, sweaty, and nausea.   Patient reports to me that he does not currently have any suicidal or homicidal ideation, no hallucinations, has felt passive SI in the past, but not currently feeling suicidal.  Feels safe in the hospital. Relating that his SI has improved over the past weeks as his stress from college and withdrawing have improved. Denies HI.     Upon interview: Pt confirms information above, reports he was able to sleep well first time in a while after receiving medications for withdrawal.  Reports his first drink was at 15 years old and began drinking regularly in college.  Drinking became out of control beginning of fall 2024 where he was having several drinks multiple times per day.  The drinking caused him to do poorly in school.  He has tried to stop drinking 2 times but each time had intolerable withdrawal symptoms including nausea vomiting and night sweats.  Reports occasionally using cannabis to deal with this as well.  Used to smoke cannabis regularly in high school but less so during college when his drinking got worse.  Denies significant mental health history but has had a worsening in mood, feeling more down and passive suicidal ideation when intoxicated in the past, denies having any active SI with plan or intent and denies any self harm or suicide attempts or psychiatric admissions.  Feels he has a good support system with his family in [place] and his girlfriend.  He has  never taken medications regularly for his health, but would like to learn more about medications for alcohol use disorder.  He would be interested in treatment center programming however he would not like to be an inpatient program where he would have to stay over at the location, is open to an IOP.  Since arriving he has not had a bowel movement but has not been able to eat well and urinate okay. Denies other physical health concerns.               Psychiatric Review of Systems:   Depression:   Reports: changes in sleep, helplessness,   Denies: depressed mood, suicidal ideation, decreased interest, changes in appetite, guilt, hopelessness, impaired concentration, decreased energy, irritability.  Madisyn:   Denies: sleeplessness, impulsiveness, racing thoughts, increased goal-directed activities, pressured speech, increase in energy  Psychosis:   Denies: visual hallucinations, auditory hallucinations, paranoia, grandiosity, ideas of reference, thought insertions, thought broadcasting.  Anxiety:   Denies: excessive worries that are difficult to control, panic attacks  PTSD:   Denies: re-experiencing past trauma, nightmares, increased arousal, avoidance of traumatic stimuli, impaired function.  OCD:   Denies: obsessions, checking, symmetry, cleaning, skin picking.  ED:   Reports: Denies: restriction, binging, purging, excessive exercise, laxative abuse         Medical Review of Systems:     10 point review of systems is otherwise negative unless noted above.            Psychiatric History:   Psychiatric Hospitalizations: Denies  History of Psychosis: Denies  Prior ECT: Denies  Court Commitment: Denies  Suicide Attempts: Denies  Self-injurious Behavior: Denies  Violence toward others: Denies  Use of Psychotropics: Denies         Substance Use History:   Alcohol: See HPI   Cannabis: See HPI  Nicotine: none  Cocaine: none  Methamphetamine: none  Opiates/Heroin: none  Benzodiazepines: none  Hallucinogens: none  Inhalants:  none      Prior Chemical Dependency treatment: none          Social History:   Upbringing: Grew up in 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  No family history on file.            Past Medical History:     No past medical history on file.    History of seizures: denies         Past Surgical History:     No past surgical history on file.           Allergies:      Allergies   Allergen Reactions    Pollen Extract Other (See Comments)              Medications:   I have reviewed this patient's current medications    No medications prior to admission.             Labs:     Recent Results (from the past 24 hours)   Comprehensive metabolic panel    Collection Time: 05/21/25  5:46 PM   Result Value Ref Range    Sodium 140 135 - 145 mmol/L    Potassium 3.9 3.4 - 5.3 mmol/L    Carbon Dioxide (CO2) 26 22 - 29 mmol/L    Anion Gap 17 (H) 7 - 15 mmol/L    Urea Nitrogen 9.5 6.0 - 20.0 mg/dL    Creatinine 0.81 0.67 - 1.17 mg/dL    GFR Estimate >90 >60 mL/min/1.73m2    Calcium 10.3 8.8 - 10.4 mg/dL    Chloride 97 (L) 98 - 107 mmol/L    Glucose 85 70 - 99 mg/dL    Alkaline Phosphatase 135 40 - 150 U/L     (H) 0 - 45 U/L     (H) 0 - 70 U/L    Protein Total 8.3 6.4 - 8.3 g/dL    Albumin 5.2 3.5 - 5.2 g/dL    Bilirubin Total 1.8 (H) <=1.2 mg/dL   Magnesium    Collection Time: 05/21/25  5:46 PM   Result Value Ref Range    Magnesium 1.7 1.7 - 2.3 mg/dL   Ethanol Level Blood    Collection Time: 05/21/25  5:46 PM   Result Value Ref Range    Ethanol Level Blood <0.01 <=0.01  g/dL   CBC with platelets and differential    Collection Time: 05/21/25  5:46 PM   Result Value Ref Range    WBC Count 4.7 4.0 - 11.0 10e3/uL    RBC Count 4.73 4.40 - 5.90 10e6/uL    Hemoglobin 16.1 13.3 - 17.7 g/dL    Hematocrit 45.4 40.0 - 53.0 %    MCV 96 78 - 100 fL    MCH 34.0 (H) 26.5 - 33.0 pg    MCHC 35.5 31.5 - 36.5 g/dL    RDW 12.4 10.0 - 15.0 %    Platelet Count 255 150 - 450 10e3/uL    % Neutrophils 52 %    % Lymphocytes 27 %    % Monocytes 17 %    % Eosinophils 2 %    % Basophils 1 %    % Immature Granulocytes 0 %    NRBCs per 100 WBC 0 <1 /100    Absolute Neutrophils 2.4 1.6 - 8.3 10e3/uL    Absolute Lymphocytes 1.3 0.8 - 5.3 10e3/uL    Absolute Monocytes 0.8 0.0 - 1.3 10e3/uL    Absolute Eosinophils 0.1 0.0 - 0.7 10e3/uL    Absolute Basophils 0.1 0.0 - 0.2 10e3/uL    Absolute Immature Granulocytes 0.0 <=0.4 10e3/uL    Absolute NRBCs 0.0 10e3/uL   GGT    Collection Time: 05/21/25  5:46 PM   Result Value Ref Range     (H) 8 - 61 U/L   TSH with free T4 reflex    Collection Time: 05/21/25  5:46 PM   Result Value Ref Range    TSH 2.43 0.30 - 4.20 uIU/mL   Hemoglobin A1c    Collection Time: 05/21/25  5:46 PM   Result Value Ref Range    Estimated Average Glucose 97 <117 mg/dL    Hemoglobin A1C 5.0 <5.7 %   Urine Drug Screen Panel    Collection Time: 05/21/25  6:25 PM   Result Value Ref Range    Amphetamines Urine Screen Negative Screen Negative    Barbituates Urine Screen Negative Screen Negative    Benzodiazepine Urine Screen Negative Screen Negative    Cannabinoids Urine Screen Positive (A) Screen Negative    Cocaine Urine Screen Negative Screen Negative    Fentanyl Qual Urine Screen Negative Screen Negative    Opiates Urine Screen Negative Screen Negative    PCP Urine Screen Negative Screen Negative   Comprehensive metabolic panel    Collection Time: 05/22/25  9:04 AM   Result Value Ref Range    Sodium 139 135 - 145 mmol/L    Potassium 4.2 3.4 - 5.3 mmol/L    Carbon Dioxide (CO2) 28 22 - 29 mmol/L  "   Anion Gap 12 7 - 15 mmol/L    Urea Nitrogen 8.6 6.0 - 20.0 mg/dL    Creatinine 0.84 0.67 - 1.17 mg/dL    GFR Estimate >90 >60 mL/min/1.73m2    Calcium 10.3 8.8 - 10.4 mg/dL    Chloride 99 98 - 107 mmol/L    Glucose 69 (L) 70 - 99 mg/dL    Alkaline Phosphatase 120 40 - 150 U/L    AST 99 (H) 0 - 45 U/L    ALT 84 (H) 0 - 70 U/L    Protein Total 7.7 6.4 - 8.3 g/dL    Albumin 4.8 3.5 - 5.2 g/dL    Bilirubin Total 1.3 (H) <=1.2 mg/dL       /77 (BP Location: Left arm, Patient Position: Sitting, Cuff Size: Adult Regular)   Pulse 76   Temp 97.1  F (36.2  C) (Temporal)   Resp 16   Ht 1.88 m (6' 2\")   Wt 73.9 kg (163 lb)   SpO2 98%   BMI 20.93 kg/m    Weight is 163 lbs 0 oz  Body mass index is 20.93 kg/m .           Psychiatric Mental Status Examination:   Appearance: awake, alert, adequately groomed, appears recorded age  Attitude: cooperative and pleasant  Eye Contact: good  Mood:  \"Anxious\"  Affect: mood congruent and full range  Speech:  clear, coherent and normal prosody  Language: fluent in English  Psychomotor Behavior:  no evidence of tardive dyskinesia, dystonia, or tics.  Moderate tremors in the hands at baseline.  No tongue fasciculations.  Gait/Station: normal  Thought Process:  linear, logical, goal oriented  Associations:  no loose associations  Thought Content:  Denying SI/HI/AVH; no evidence of psychotic thinking  Insight:  intact  Judgement: intact  Oriented to:  time, person, and place  Attention Span and Concentration:  intact  Recent and Remote Memory:  intact  Fund of Knowledge: appropriate         Physical Exam:   Please refer to physical exam completed by ED provider, Hosea Patterson MD, on 5/21/25 as below. I agree with the findings and assessment and have no additional findings to add at this time with exception that psychiatric findings now above in MSE.   Physical Exam  Constitutional:       General: He is not in acute distress.     Appearance: Normal appearance. He is not " toxic-appearing.   HEENT:      Head: Normocephalic and atraumatic.   Eyes:      General: No scleral icterus.     Conjunctiva/sclera: Conjunctivae normal.   Cardiovascular:      Rate and Rhythm: Normal rate and regular rhythm.      Pulses: Normal pulses.      Heart sounds: Normal heart sounds.   Pulmonary:      Effort: Pulmonary effort is normal. No respiratory distress.      Breath sounds: Normal breath sounds.   Abdominal:      Palpations: Abdomen is soft.      Tenderness: There is no abdominal tenderness.   Musculoskeletal:         General: No deformity.      Cervical back: Neck supple.   Skin:     General: Skin is warm.   Neurological:      Mental Status: He is alert and oriented to person, place, and time.      Comments: Slight hand shaking and tongue fasciculation, otherwise neurologic intact, no focal deficits

## 2025-05-22 NOTE — PLAN OF CARE
Behavioral Team Discussion: (5/22/2025)    Continued Stay Criteria/Rationale: Patient admitted for Chemical Use Issues.  Plan: The following services will be provided to the patient; psychiatric assessment, medication management, therapeutic milieu, individual and group support, and skills groups.   Participants: 3A Provider: Dr. Yanelis Damon MD; 3A RN: Valentina Coburn RN; 3A CM's: ABELARDO Wiggins.  Summary/Recommendation: Providers will assess today for treatment recommendations, discharge planning, and aftercare plans. CM will meet with pt for discharge planning.   Medical/Physical: Per chart review pt reported symptoms current of tremors, sweaty, and nausea.   Precautions:   Behavioral Orders   Procedures    Code 1 - Restrict to Unit    Routine Programming     As clinically indicated    Status 15     Every 15 minutes.    Suicide precautions: Suicide Risk: LOW; Clinical rationale to override score: modification to the care environment     .     Suicide Risk:   LOW     Clinical rationale to override score::   modification to the care environment    Withdrawal precautions     Rationale for change in precautions or plan: N/A  Progress: Initial.    ASAM Dimension Scale Ratings:  Dimension 1: 2 Client has some difficulty tolerating and coping with withdrawal discomfort. Client's intoxication may be severe, but responds to support and treatment such that the client does not immediately endanger self or others. Client displays moderate signs and symptoms with moderate risk of severe withdrawal.  Dimension 2: 1 Client tolerates and nubia with physical discomfort and is able to get the services that the client needs.  Dimension 3: 1 Client has impulse control and coping skills. Client presents a mild to moderate risk of harm to self or others or displays symptoms of emotional, behavioral or cognitive problems. Client has a mental health diagnosis and is stable. Client functions adequately in significant life  areas.  Dimension 4: 2 Client displays verbal compliance, but lacks consistent behaviors; has low motivation for change; and is passively involved in treatment.  Dimension 5: 3 Client has poor recognition and understanding of relapse and recidivism issues and displays moderately high vulnerability for further substance use or mental health problems. Client has few coping skills and rarely applies coping skills.  Dimension 6: 3 Client is not engaged in structured, meaningful activity and the client's peers, family, significant other, and living environment are unsupportive, or there is significant criminal justice system involvement.

## 2025-05-22 NOTE — PROGRESS NOTES
05/21/25 2032   Patient Belongings   Did you bring any home meds/supplements to the hospital?  No   Patient Belongings other (see comments)   Belongings Search Yes   Clothing Search Yes   Second Staff Alebart     Storage Bin: Pant with string, per socks, hat ,small bag and toile tries with 1 Valentino clone, 2 Julio Salix, shaver, Deodorant and teeth paste  Meed Room Box Bin: Cell phone, wallet, 2 chargers and Necklace  Security env#[id number]: 3 [place]isa and [place] ID   A               Admission:  I am responsible for any personal items that are not sent to the safe or pharmacy.  [facility] is not responsible for loss, theft or damage of any property in my possession.    Signature:  _________________________________ Date: _______  Time: _____                                              Staff Signature:  ____________________________ Date: ________  Time: _____      2nd Staff person, if patient is unable/unwilling to sign:    Signature: ________________________________ Date: ________  Time: _____     Discharge:  [facility] has returned all of my personal belongings:    Signature: _________________________________ Date: ________  Time: _____                                          Staff Signature:  ____________________________ Date: ________  Time: _____

## 2025-05-22 NOTE — PHARMACY-ADMISSION MEDICATION HISTORY
Pharmacist Admission Medication History    Admission medication history is complete. The information provided in this note is only as accurate as the sources available at the time of the update.    Information Source(s): Patient via in-person    Pertinent Information: Pt denied medications, supplements and vitamins    Changes made to PTA medication list:  Added: None  Deleted: None  Changed: None    Allergies reviewed with patient and updates made in EHR: yes    Medication History Completed By: Isidra Pickens [facility] 5/21/2025 7:25 PM    No outpatient medications have been marked as taking for the 5/21/25 encounter (Hospital Encounter).

## 2025-05-22 NOTE — PLAN OF CARE
"Goal Outcome Evaluation:    Problem: Alcohol Withdrawal  Goal: Alcohol Withdrawal Symptom Control  Outcome: Progressing     Patient appeared to sleep for 6.75 hours.    MSSA at midnight was 4; patient was mildly tremulous.  /69   Pulse 60   Temp 97.6  F (36.4  C) (Temporal)   Resp 16   Ht 1.88 m (6' 2\")   Wt 73.9 kg (163 lb)   SpO2 97%   BMI 20.93 kg/m      MSSA at 4am was 4.  /68   Pulse 73   Temp 97.1  F (36.2  C) (Temporal)   Resp 16   Ht 1.88 m (6' 2\")   Wt 73.9 kg (163 lb)   SpO2 96%   BMI 20.93 kg/m      We'll continue to monitor.    "

## 2025-05-22 NOTE — PROGRESS NOTES
[address]     Daily Encounter: Met with team, discussed patient progress, discharge plan and any impediments to discharge.    Pt shares he has been drinking heavily for the past year and his use has caused problems with his schooling, grades and family. He reported stress at school has been his biggest trigger for use. Pt shares he is done with school this semester and is interested in attending an IOP program and will continue to live with his parents. Pt was encouraged to complete [facility] paperwork and safety plan.     Insurance: Consignd/Consignd BIND SUREST      Legal Status:  Vol   County: NA  File Number: NA  Start and expiration date of commitment: NA    SUDs Assessment Status: Need for placement     ROIs on file: None at this time.      Living Situation: Lives in [place] with his parents.      Current Providers, Supports & Collateral:  Parents, PCP at [facility]    Current Plan/Referral Status: IOP [facility] Tx     Safety Plan Status: discussed with pt, gave paperwork, will continue safety plan in a future session      ABELARDO Wiggins  [address] - Adult Inpatient Addiction Psychiatry Unit

## 2025-05-23 VITALS
SYSTOLIC BLOOD PRESSURE: 111 MMHG | WEIGHT: 163 LBS | DIASTOLIC BLOOD PRESSURE: 71 MMHG | BODY MASS INDEX: 20.92 KG/M2 | HEART RATE: 89 BPM | TEMPERATURE: 97.5 F | OXYGEN SATURATION: 98 % | HEIGHT: 74 IN | RESPIRATION RATE: 16 BRPM

## 2025-05-23 PROCEDURE — 250N000013 HC RX MED GY IP 250 OP 250 PS 637: Performed by: STUDENT IN AN ORGANIZED HEALTH CARE EDUCATION/TRAINING PROGRAM

## 2025-05-23 PROCEDURE — 99239 HOSP IP/OBS DSCHRG MGMT >30: CPT | Mod: GC | Performed by: PSYCHIATRY & NEUROLOGY

## 2025-05-23 PROCEDURE — 250N000013 HC RX MED GY IP 250 OP 250 PS 637: Performed by: PSYCHIATRY & NEUROLOGY

## 2025-05-23 RX ORDER — FOLIC ACID 1 MG/1
1 TABLET ORAL DAILY
Qty: 30 TABLET | Refills: 0 | Status: SHIPPED | OUTPATIENT
Start: 2025-05-24

## 2025-05-23 RX ORDER — NALTREXONE HYDROCHLORIDE 50 MG/1
50 TABLET, FILM COATED ORAL DAILY
Qty: 30 TABLET | Refills: 1 | Status: SHIPPED | OUTPATIENT
Start: 2025-05-23

## 2025-05-23 RX ORDER — MULTIPLE VITAMINS W/ MINERALS TAB 9MG-400MCG
1 TAB ORAL DAILY
Qty: 30 TABLET | Refills: 0 | Status: SHIPPED | OUTPATIENT
Start: 2025-05-24

## 2025-05-23 RX ORDER — HYDROXYZINE HYDROCHLORIDE 25 MG/1
25 TABLET, FILM COATED ORAL 3 TIMES DAILY PRN
Qty: 90 TABLET | Refills: 0 | Status: SHIPPED | OUTPATIENT
Start: 2025-05-23

## 2025-05-23 RX ORDER — NICOTINE 21 MG/24HR
1 PATCH, TRANSDERMAL 24 HOURS TRANSDERMAL EVERY 24 HOURS
Qty: 28 PATCH | Refills: 0 | Status: SHIPPED | OUTPATIENT
Start: 2025-05-23

## 2025-05-23 RX ORDER — TRAZODONE HYDROCHLORIDE 50 MG/1
50 TABLET ORAL
Qty: 30 TABLET | Refills: 0 | Status: SHIPPED | OUTPATIENT
Start: 2025-05-23

## 2025-05-23 RX ORDER — LANOLIN ALCOHOL/MO/W.PET/CERES
100 CREAM (GRAM) TOPICAL DAILY
Qty: 30 TABLET | Refills: 0 | Status: SHIPPED | OUTPATIENT
Start: 2025-05-24

## 2025-05-23 RX ADMIN — Medication 1 TABLET: at 08:22

## 2025-05-23 RX ADMIN — FOLIC ACID 1 MG: 1 TABLET ORAL at 08:22

## 2025-05-23 RX ADMIN — THIAMINE HCL TAB 100 MG 100 MG: 100 TAB at 08:22

## 2025-05-23 ASSESSMENT — ACTIVITIES OF DAILY LIVING (ADL)
ADLS_ACUITY_SCORE: 15
ORAL_HYGIENE: INDEPENDENT
ADLS_ACUITY_SCORE: 15
HYGIENE/GROOMING: INDEPENDENT
DRESS: SCRUBS (BEHAVIORAL HEALTH)

## 2025-05-23 NOTE — PLAN OF CARE
Problem: Alcohol Withdrawal  Goal: Alcohol Withdrawal Symptom Control  5/23/2025 0642 by Anny Weber RN  Outcome: Progressing  5/23/2025 0642 by Anny Weber RN  Outcome: Progressing     Problem: Sleep Disturbance  Goal: Adequate Sleep/Rest  Outcome: Progressing   Goal Outcome Evaluation:       Pt.appeared asleep and comfortable through the night. Breath sounds were even and regular. Scored 2 on MSSA. No prn medication administered. No safety or behavioral concerns observed or reported. Will continue to monitor.

## 2025-05-23 NOTE — PLAN OF CARE
Problem: Alcohol Withdrawal  Goal: Alcohol Withdrawal Symptom Control  Outcome: Progressing   Goal Outcome Evaluation:    Plan of Care Reviewed With: patient      Patient visible in the milieu. Engaged with peers, calm and pleasant. Patient endorsed anxiety and depression. Denied SI/HI/SIB. Patient reported good appetite. No c/o pain. MSSA scores of 6 and 4 at this shift. No prn valium given for alcohol withdrawal. Patient compliant with medications. Staff will continue to monitor.

## 2025-05-23 NOTE — DISCHARGE SUMMARY
Psychiatric Discharge Summary    César Solomon MRN# [id number]   Age: 20 year old YOB: 2005     Date of Admission:  5/21/2025  Date of Discharge:  5/23/2025  Admitting Physician:  Yanelis Damon DO  Discharge Physician:  Yanelis Damon DO         Event Leading to Hospitalization:   César is a 20 year old male with history of alcohol use disorder who presented to ED seeking detox from alcohol.      Chart review completed including ED notes from this encounter; PCP visit on 1/27/25 in [place] for dizziness and visual changes; several Allergy care visits in last year; no prior ed visits or hospitalizations due to alcohol.  Majority of patient's care in the last few years in [place] (where patient is going to school for business).     Per ED physician note: César Solomon is a 20 year old male who presents to the ED for alcohol withdrawal seeking alcohol detox. He relates that his last drink was around 2-3 pm. States that he has been drinking .75 L of whiskey daily with 1-2 beers in addition. He states that he has been drinking this much for the past few months since to avoid withdrawing from alcohol. He denies history of alcohol detox treatment. Denies past symptoms of seizures or Dt's.   Patient endorses symptoms current of tremors, sweaty, and nausea.   Patient reports to me that he does not currently have any suicidal or homicidal ideation, no hallucinations, has felt passive SI in the past, but not currently feeling suicidal.  Feels safe in the hospital. Relating that his SI has improved over the past weeks as his stress from college and withdrawing have improved. Denies HI.      Upon interview: Pt confirms information above, reports he was able to sleep well first time in a while after receiving medications for withdrawal.  Reports his first drink was at 15 years old and began drinking regularly in college.  Drinking became out of control beginning of fall 2024 where he was having  several drinks multiple times per day.  The drinking caused him to do poorly in school.  He has tried to stop drinking 2 times but each time had intolerable withdrawal symptoms including nausea vomiting and night sweats.  Reports occasionally using cannabis to deal with this as well.  Used to smoke cannabis regularly in high school but less so during college when his drinking got worse.  Denies significant mental health history but has had a worsening in mood, feeling more down and passive suicidal ideation when intoxicated in the past, denies having any active SI with plan or intent and denies any self harm or suicide attempts or psychiatric admissions.  Feels he has a good support system with his family in [place] and his girlfriend.  He has never taken medications regularly for his health, but would like to learn more about medications for alcohol use disorder. He is interested in an IOP. Would like to try naltrexone for AUD. Would like to try Nicotine patch for daily Vaping 6 mg/L day disposable cartridge. Would like to continue trazodone and hydrOXYzine for sleep and anxiety respectively after discharge. Was educated on the side effects risks and benefits of each medication. Regarding UDS reports his roomates smoke a lot of cannabis, he may smoke a few times a month, last time was 2 weeks ago. Denies other physical health concerns.        See Admission note by Gabriele Agee MD/Yanelis Damon DO on 5/22/25 for additional details.          Diagnoses:     Alcohol use disorder, severe, dependence with withdrawal with unspecified complication  Cannabis use not meeting criteria for a use disorder.  Nicotine Use Disorder, Mild  Unspecified anxiety R/O substance induced mood disorder   Elevated LFTs including hyperbilirubinemia  High anion gap metabolic acidosis     Clinically Significant Risk Factors          # Hypochloremia: Lowest Cl = 97 mmol/L in last 2 days, will monitor as appropriate                                      Labs:     Recent Results (from the past week)   Comprehensive metabolic panel    Collection Time: 05/21/25  5:46 PM   Result Value Ref Range    Sodium 140 135 - 145 mmol/L    Potassium 3.9 3.4 - 5.3 mmol/L    Carbon Dioxide (CO2) 26 22 - 29 mmol/L    Anion Gap 17 (H) 7 - 15 mmol/L    Urea Nitrogen 9.5 6.0 - 20.0 mg/dL    Creatinine 0.81 0.67 - 1.17 mg/dL    GFR Estimate >90 >60 mL/min/1.73m2    Calcium 10.3 8.8 - 10.4 mg/dL    Chloride 97 (L) 98 - 107 mmol/L    Glucose 85 70 - 99 mg/dL    Alkaline Phosphatase 135 40 - 150 U/L     (H) 0 - 45 U/L     (H) 0 - 70 U/L    Protein Total 8.3 6.4 - 8.3 g/dL    Albumin 5.2 3.5 - 5.2 g/dL    Bilirubin Total 1.8 (H) <=1.2 mg/dL   Magnesium    Collection Time: 05/21/25  5:46 PM   Result Value Ref Range    Magnesium 1.7 1.7 - 2.3 mg/dL   Ethanol Level Blood    Collection Time: 05/21/25  5:46 PM   Result Value Ref Range    Ethanol Level Blood <0.01 <=0.01 g/dL   CBC with platelets and differential    Collection Time: 05/21/25  5:46 PM   Result Value Ref Range    WBC Count 4.7 4.0 - 11.0 10e3/uL    RBC Count 4.73 4.40 - 5.90 10e6/uL    Hemoglobin 16.1 13.3 - 17.7 g/dL    Hematocrit 45.4 40.0 - 53.0 %    MCV 96 78 - 100 fL    MCH 34.0 (H) 26.5 - 33.0 pg    MCHC 35.5 31.5 - 36.5 g/dL    RDW 12.4 10.0 - 15.0 %    Platelet Count 255 150 - 450 10e3/uL    % Neutrophils 52 %    % Lymphocytes 27 %    % Monocytes 17 %    % Eosinophils 2 %    % Basophils 1 %    % Immature Granulocytes 0 %    NRBCs per 100 WBC 0 <1 /100    Absolute Neutrophils 2.4 1.6 - 8.3 10e3/uL    Absolute Lymphocytes 1.3 0.8 - 5.3 10e3/uL    Absolute Monocytes 0.8 0.0 - 1.3 10e3/uL    Absolute Eosinophils 0.1 0.0 - 0.7 10e3/uL    Absolute Basophils 0.1 0.0 - 0.2 10e3/uL    Absolute Immature Granulocytes 0.0 <=0.4 10e3/uL    Absolute NRBCs 0.0 10e3/uL   GGT    Collection Time: 05/21/25  5:46 PM   Result Value Ref Range     (H) 8 - 61 U/L   TSH with free T4 reflex    Collection Time:  05/21/25  5:46 PM   Result Value Ref Range    TSH 2.43 0.30 - 4.20 uIU/mL   Hemoglobin A1c    Collection Time: 05/21/25  5:46 PM   Result Value Ref Range    Estimated Average Glucose 97 <117 mg/dL    Hemoglobin A1C 5.0 <5.7 %   Urine Drug Screen Panel    Collection Time: 05/21/25  6:25 PM   Result Value Ref Range    Amphetamines Urine Screen Negative Screen Negative    Barbituates Urine Screen Negative Screen Negative    Benzodiazepine Urine Screen Negative Screen Negative    Cannabinoids Urine Screen Positive (A) Screen Negative    Cocaine Urine Screen Negative Screen Negative    Fentanyl Qual Urine Screen Negative Screen Negative    Opiates Urine Screen Negative Screen Negative    PCP Urine Screen Negative Screen Negative   Comprehensive metabolic panel    Collection Time: 05/22/25  9:04 AM   Result Value Ref Range    Sodium 139 135 - 145 mmol/L    Potassium 4.2 3.4 - 5.3 mmol/L    Carbon Dioxide (CO2) 28 22 - 29 mmol/L    Anion Gap 12 7 - 15 mmol/L    Urea Nitrogen 8.6 6.0 - 20.0 mg/dL    Creatinine 0.84 0.67 - 1.17 mg/dL    GFR Estimate >90 >60 mL/min/1.73m2    Calcium 10.3 8.8 - 10.4 mg/dL    Chloride 99 98 - 107 mmol/L    Glucose 69 (L) 70 - 99 mg/dL    Alkaline Phosphatase 120 40 - 150 U/L    AST 99 (H) 0 - 45 U/L    ALT 84 (H) 0 - 70 U/L    Protein Total 7.7 6.4 - 8.3 g/dL    Albumin 4.8 3.5 - 5.2 g/dL    Bilirubin Total 1.3 (H) <=1.2 mg/dL   Extra Purple Top Tube    Collection Time: 05/22/25  9:04 AM   Result Value Ref Range    Hold Specimen [facility]             Consults:    IM consult placed for management of other medical concerns, per consult note on 5/22/25:   César Solomon is a 20 year old male with no significant PMH who was admitted on 5/21/2025 to inpatient detox for acute alcohol withdrawal.      Medicine was consulted for medical co-management.      Acute alcohol withdrawal   Presented with an undetectable blood alcohol level. Has bene drinking 750 mL of whiskey daily with 1-2 beers in addition, has  been drinking this much for the past few months to avoid withdrawing. Last drink was 2-3 hours prior to presenting to ED. Denies history of withdrawal seizures or Dt's.    -Management per Psychiatry  -MSSA/CIWA with valium   -Thiamine/folate/MVI     Elevated LFTs   Presented with , , T Bilirubin of 1.8. Alk phos wnl. No prior CMP for comparison. No prior abdominal imaging. Repeat showed improvement. No abdominal pain, nausea, vomiting   -Recheck with PCP in ~1 week from discharge      Cannabis use: note don UDS      Medicine will sign off at this time. Please reach out to medicine team if further questions arise.         The patient's care was discussed with the Bedside Nurse, Patient, and Primary team via this note.     Ernestina Mckenna PA-C  Hospitalist Service         Hospital Course:   César Solomon is a 20 year old male with history of alcohol use disorder who presented to ED seeking detox from alcohol. Medically cleared in ED, admitted to  as voluntary patient. Drinking 1.75 L of whiskey every 2 to 3 days. EtOH BROOKE <0.01, UDS positive for cannabinoids. MSSA with diazepam started for alcohol withdrawal, changed to lorazepam due to LFTs. Withdrawal precautions in place, denies history of seizures. Admission labs ordered and reviewed those resulted. IM consult placed.  No safety concerns including SI and HI. Pt not taking medications PTA, open to considering AUD MAT. Pt reports goals for hospitalization are to abstinence from alcohol and get set up with some form of Treatment Programming.    The patient's symptoms of alcohol withdrawal improved. He was out of detox on 5/23 and requested to discharge home. He is interested in medications for AUD, and Nicotine Use disorder, anxiety and sleep. Discussed R/B/A of naltrexone, hydroxyzine, trazodone and NRT. He plans to follow up with IOP after discharge, along with his pediatrician, and establish with a therapist.    Today César Solomon reports  no thoughts of harming self or others. In addition, he has notable risk factors for self-harm, including age, single status, anxiety, and substance abuse. However, risk is mitigated by commitment to family, absence of past attempts, ability to volunteer a safety plan, history of seeking help when needed, and pt is future oriented and denying SI. Therefore, based on all available evidence including the factors cited above, he does not appear to be at imminent risk for self-harm, does not meet criteria for a 72-hr hold, and therefore remains appropriate for ongoing outpatient level of care.     César Solomon was discharged to home. At the time of discharge César Solomon was determined to not be a danger to himself or others.          Discharge Medications:        Review of your medicines        START taking        Dose / Directions   folic acid 1 MG tablet  Commonly known as: FOLVITE  Used for: Chemical dependency (H)      Dose: 1 mg  Start taking on: May 24, 2025  Take 1 tablet (1 mg) by mouth daily.  Quantity: 30 tablet  Refills: 0     hydrOXYzine HCl 25 MG tablet  Commonly known as: ATARAX      Dose: 25 mg  Take 1 tablet (25 mg) by mouth 3 times daily as needed for anxiety.  Quantity: 90 tablet  Refills: 0     multivitamin w/minerals tablet  Used for: Chemical dependency (H)      Dose: 1 tablet  Start taking on: May 24, 2025  Take 1 tablet by mouth daily.  Quantity: 30 tablet  Refills: 0     naltrexone 50 MG tablet  Commonly known as: DEPADE/REVIA  Used for: Chemical dependency (H)      Dose: 50 mg  Take 1 tablet (50 mg) by mouth daily.  Quantity: 30 tablet  Refills: 1     nicotine 21 MG/24HR 24 hr patch  Commonly known as: NICODERM CQ  Used for: Chemical dependency (H)      Dose: 1 patch  Place 1 patch over 24 hours onto the skin every 24 hours.  Quantity: 28 patch  Refills: 0     thiamine 100 MG tablet  Commonly known as: B-1  Used for: Chemical dependency (H)      Dose: 100 mg  Start taking on: May 24,  2025  Take 1 tablet (100 mg) by mouth daily.  Quantity: 30 tablet  Refills: 0     traZODone 50 MG tablet  Commonly known as: DESYREL      Dose: 50 mg  Take 1 tablet (50 mg) by mouth nightly as needed for sleep.  Quantity: 30 tablet  Refills: 0               Where to get your medicines        These medications were sent to [facility] [place], [place] - [address]  [address] [facility], [facility] [address]      Phone: [phone]   folic acid 1 MG tablet  hydrOXYzine HCl 25 MG tablet  multivitamin w/minerals tablet  naltrexone 50 MG tablet  nicotine 21 MG/24HR 24 hr patch  thiamine 100 MG tablet  traZODone 50 MG tablet             Psychiatric Examination:   Appearance:  awake, alert, adequately groomed, dressed in hospital scrubs, and appeared as age stated  Attitude:  cooperative  Eye Contact:  good  Mood:  better  Affect:  appropriate and in normal range  Speech:  clear, coherent  Psychomotor Behavior:  no evidence of tardive dyskinesia, dystonia, or tics Moderate tremors, improved from prior.  Thought Process:  logical, linear, and goal oriented  Associations:  no loose associations  Thought Content:  no evidence of suicidal ideation or homicidal ideation and no evidence of psychotic thought  Insight:  good  Judgment:  intact  Oriented to:  time, person, and place  Attention Span and Concentration:  intact  Recent and Remote Memory:  intact  Language: Fluent in english  Fund of Knowledge: appropriate  Muscle Strength and Tone: normal  Gait and Station: Normal         Discharge Plan:   Recommendation:  Please abstain from the use of all mood altering chemicals.    Outpatient treatment options to look into are listed below. Please contact your insurance provider for more options if the following are not covered.     [facility] ([facility] with sober housing options)  [address] [place] [address]  Phone: [phone]     [facility]  Phone: [phone]  Fax:  [phone]  [email or web address]     [facility] - short term, IOP, and opioid outpatient  Phone/Fax for all locations  [facility]  Phone: [phone]  Fax: [phone]     [facility]  Main phone: [phone]  Direct Dial: [phone]    Disposition: Home     Follow-up Appointment:   [facility]  Dr Kvng Ayoub May 25th @ 9am      Additional recommendations:  Avoid Alcohol and other addictive substances.  Attend Substance Use Disorder Support Groups Such as AA up to 3 times a week. A list of locations is included: [email or web address] or online at [email or web address]  Attend Appointments as above  Continue medications as Above, take them as prescribed.  Call 0-254-QUIT-NOW for smoking resources    Gabriele Agee MD  Addiction Med Fellow    Attestation:  This patient has been seen and evaluated by me, Yanelis Damon DO on the day of discharge.  have discussed this patient with the fellow and I agree with the findings and plan in this note. I have reviewed today's vital signs, medications, labs and imaging. 34 min total time that was spent in counseling and coordination of care with staff, reviewing medical record, educating patient about treatment options, side effects and benefits and alternative treatments for medications, providing supportive therapy and redirection regarding above symptoms and coordination of discharge planning.     Yanelis Damon DO

## 2025-05-23 NOTE — PROGRESS NOTES
[address]     Daily Encounter: Met with team, discussed patient progress, discharge plan and any impediments to discharge.    Pt will discharge on this date and will be living with his parents. Pt has been provided OP [facility]IL resources on his AVS that he plans to research to be sure his insurance will cover. Pt has been provided resources to set up therapy on his AVS as well per his request.     Pt shares he has been drinking heavily for the past year and his use has caused problems with his schooling, grades and family. He reported stress at school has been his biggest trigger for use. Pt shares he is done with school this semester and is interested in attending an IOP program and will continue to live with his parents. Pt was encouraged to complete AKHIL paperwork and safety plan.     Insurance: Ynsect/Ynsect [facility] SURE      Legal Status:  Vol   County: NA  File Number: NA  Start and expiration date of commitment: NA    SUDs Assessment Status: Need for placement     ROIs on file: None at this time.      Living Situation: Lives in [place] with his parents.      Current Providers, Supports & Collateral:  Parents, PCP at [facility]    Current Plan/Referral Status: IOP [facility] Tx     Safety Plan Status: Completed      ABELARDO Wiggins  [facility] [facility] Psychiatry [facility]

## 2025-05-23 NOTE — PLAN OF CARE
Problem: Alcohol Withdrawal  Goal: Alcohol Withdrawal Symptom Control  Outcome: Progressing   Goal Outcome Evaluation:    Plan of Care Reviewed With: patient             Pt has not received any withdrawal meds in >24 hours , he is therefore considered OOD. He is Michael stable, denies pain, he is going home to parents house and plans to intake at an IOP program, referrals made for [facility].    This morning he denies SI/HI, endorsing a good appetite, was visible in the milieu, bright and engaged in the milieu socializing with peers and staff. Pt encouraged to make a follow up appointment with PCP, mom scheduling the appointment.     Discharge Meds and paperwork reviewed with patient, Belongings returned. Pt will be picked up by both parents.    Pt made a follow up appointment with PCP at [facility] on  Thursday May 29th at 9am to recheck his elevated LFT's.     Naltrexone , Trazodone and Hydroxyzine ordered for discharge.

## 2025-06-04 ENCOUNTER — LAB REQUISITION (OUTPATIENT)
Dept: LAB | Facility: CLINIC | Age: 20
End: 2025-06-04
Payer: COMMERCIAL

## 2025-06-04 DIAGNOSIS — F10.10 ALCOHOL ABUSE, UNCOMPLICATED: ICD-10-CM

## 2025-06-04 PROCEDURE — 82977 ASSAY OF GGT: CPT | Mod: ORL | Performed by: PEDIATRICS

## 2025-06-05 LAB — GGT SERPL-CCNC: 129 U/L (ref 8–61)

## 2025-06-25 ENCOUNTER — LAB REQUISITION (OUTPATIENT)
Dept: LAB | Facility: CLINIC | Age: 20
End: 2025-06-25
Payer: COMMERCIAL

## 2025-06-25 DIAGNOSIS — F19.10 OTHER PSYCHOACTIVE SUBSTANCE ABUSE, UNCOMPLICATED (H): ICD-10-CM

## 2025-06-25 LAB
AMPHETAMINES UR QL SCN: NORMAL
BARBITURATES UR QL SCN: NORMAL
BENZODIAZ UR QL SCN: NORMAL
BZE UR QL SCN: NORMAL
CANNABINOIDS UR QL SCN: NORMAL
FENTANYL UR QL: NORMAL
LAB ORDER RESULT STATUS: NORMAL
Lab: NORMAL
OPIATES UR QL SCN: NORMAL
PCP QUAL URINE (ROCHE): NORMAL
PERFORMING LABORATORY: NORMAL
TEST NAME: NORMAL

## 2025-06-25 PROCEDURE — 80307 DRUG TEST PRSMV CHEM ANLYZR: CPT | Mod: ORL | Performed by: PEDIATRICS

## 2025-06-30 LAB — LABCORP INTERFACED MISCELLANEOUS TEST RESULT: NORMAL
